# Patient Record
Sex: FEMALE | Race: WHITE | NOT HISPANIC OR LATINO | URBAN - METROPOLITAN AREA
[De-identification: names, ages, dates, MRNs, and addresses within clinical notes are randomized per-mention and may not be internally consistent; named-entity substitution may affect disease eponyms.]

---

## 2018-08-20 ENCOUNTER — INPATIENT (INPATIENT)
Facility: HOSPITAL | Age: 83
LOS: 2 days | Discharge: NURSING, REHAB OR LONG-TERM CARE | DRG: 57 | End: 2018-08-23
Attending: HOSPITALIST | Admitting: FAMILY MEDICINE
Payer: MEDICARE

## 2018-08-20 VITALS
SYSTOLIC BLOOD PRESSURE: 141 MMHG | TEMPERATURE: 99 F | HEART RATE: 81 BPM | DIASTOLIC BLOOD PRESSURE: 92 MMHG | OXYGEN SATURATION: 96 % | RESPIRATION RATE: 16 BRPM

## 2018-08-20 DIAGNOSIS — I10 ESSENTIAL (PRIMARY) HYPERTENSION: ICD-10-CM

## 2018-08-20 DIAGNOSIS — R41.0 DISORIENTATION, UNSPECIFIED: ICD-10-CM

## 2018-08-20 DIAGNOSIS — G30.0 ALZHEIMER'S DISEASE WITH EARLY ONSET: ICD-10-CM

## 2018-08-20 DIAGNOSIS — R41.82 ALTERED MENTAL STATUS, UNSPECIFIED: ICD-10-CM

## 2018-08-20 LAB
ALBUMIN SERPL ELPH-MCNC: 3.1 G/DL — LOW (ref 3.3–5)
ALP SERPL-CCNC: 96 U/L — SIGNIFICANT CHANGE UP (ref 40–120)
ALT FLD-CCNC: 18 U/L DA — SIGNIFICANT CHANGE UP (ref 10–45)
ANION GAP SERPL CALC-SCNC: 4 MMOL/L — LOW (ref 5–17)
ANISOCYTOSIS BLD QL: SLIGHT — SIGNIFICANT CHANGE UP
APPEARANCE UR: CLEAR — SIGNIFICANT CHANGE UP
AST SERPL-CCNC: 28 U/L — SIGNIFICANT CHANGE UP (ref 10–40)
BACTERIA # UR AUTO: ABNORMAL /HPF
BASOPHILS # BLD AUTO: 0.1 K/UL — SIGNIFICANT CHANGE UP (ref 0–0.2)
BILIRUB SERPL-MCNC: 0.6 MG/DL — SIGNIFICANT CHANGE UP (ref 0.2–1.2)
BILIRUB UR-MCNC: NEGATIVE — SIGNIFICANT CHANGE UP
BUN SERPL-MCNC: 16 MG/DL — SIGNIFICANT CHANGE UP (ref 7–23)
CALCIUM SERPL-MCNC: 8.6 MG/DL — SIGNIFICANT CHANGE UP (ref 8.4–10.5)
CHLORIDE SERPL-SCNC: 103 MMOL/L — SIGNIFICANT CHANGE UP (ref 96–108)
CK MB CFR SERPL CALC: <0.5 NG/ML — LOW (ref 0.5–10)
CK SERPL-CCNC: 95 U/L — SIGNIFICANT CHANGE UP (ref 25–170)
CO2 SERPL-SCNC: 30 MMOL/L — SIGNIFICANT CHANGE UP (ref 22–31)
COLOR SPEC: YELLOW — SIGNIFICANT CHANGE UP
CREAT SERPL-MCNC: 0.82 MG/DL — SIGNIFICANT CHANGE UP (ref 0.5–1.3)
DIFF PNL FLD: NEGATIVE — SIGNIFICANT CHANGE UP
EOSINOPHIL # BLD AUTO: 2.2 K/UL — HIGH (ref 0–0.5)
EOSINOPHIL NFR BLD AUTO: 18 % — HIGH (ref 0–6)
EPI CELLS # UR: SIGNIFICANT CHANGE UP
GLUCOSE SERPL-MCNC: 93 MG/DL — SIGNIFICANT CHANGE UP (ref 70–99)
GLUCOSE UR QL: NEGATIVE — SIGNIFICANT CHANGE UP
HCT VFR BLD CALC: 43.4 % — SIGNIFICANT CHANGE UP (ref 34.5–45)
HGB BLD-MCNC: 13.8 G/DL — SIGNIFICANT CHANGE UP (ref 11.5–15.5)
KETONES UR-MCNC: ABNORMAL
LEUKOCYTE ESTERASE UR-ACNC: ABNORMAL
LYMPHOCYTES # BLD AUTO: 2.8 K/UL — SIGNIFICANT CHANGE UP (ref 1–3.3)
LYMPHOCYTES # BLD AUTO: 28 % — SIGNIFICANT CHANGE UP (ref 13–44)
MCHC RBC-ENTMCNC: 31.1 PG — SIGNIFICANT CHANGE UP (ref 27–34)
MCHC RBC-ENTMCNC: 31.9 GM/DL — LOW (ref 32–36)
MCV RBC AUTO: 97.5 FL — SIGNIFICANT CHANGE UP (ref 80–100)
MONOCYTES # BLD AUTO: 0.7 K/UL — SIGNIFICANT CHANGE UP (ref 0–0.9)
NEUTROPHILS # BLD AUTO: 4.5 K/UL — SIGNIFICANT CHANGE UP (ref 1.8–7.4)
NEUTROPHILS NFR BLD AUTO: 54 % — SIGNIFICANT CHANGE UP (ref 43–77)
NITRITE UR-MCNC: NEGATIVE — SIGNIFICANT CHANGE UP
PH UR: 6 — SIGNIFICANT CHANGE UP (ref 5–8)
PLAT MORPH BLD: NORMAL — SIGNIFICANT CHANGE UP
PLATELET # BLD AUTO: 210 K/UL — SIGNIFICANT CHANGE UP (ref 150–400)
POTASSIUM SERPL-MCNC: 5.2 MMOL/L — SIGNIFICANT CHANGE UP (ref 3.5–5.3)
POTASSIUM SERPL-SCNC: 5.2 MMOL/L — SIGNIFICANT CHANGE UP (ref 3.5–5.3)
PROT SERPL-MCNC: 6.7 G/DL — SIGNIFICANT CHANGE UP (ref 6–8.3)
PROT UR-MCNC: 15
RBC # BLD: 4.45 M/UL — SIGNIFICANT CHANGE UP (ref 3.8–5.2)
RBC # FLD: 13 % — SIGNIFICANT CHANGE UP (ref 10.3–14.5)
RBC BLD AUTO: SIGNIFICANT CHANGE UP
RBC CASTS # UR COMP ASSIST: SIGNIFICANT CHANGE UP /HPF (ref 0–4)
SODIUM SERPL-SCNC: 137 MMOL/L — SIGNIFICANT CHANGE UP (ref 135–145)
SP GR SPEC: 1.02 — SIGNIFICANT CHANGE UP (ref 1.01–1.02)
TROPONIN I SERPL-MCNC: <.017 NG/ML — LOW (ref 0.02–0.06)
UROBILINOGEN FLD QL: 4
WBC # BLD: 10.1 K/UL — SIGNIFICANT CHANGE UP (ref 3.8–10.5)
WBC # FLD AUTO: 10.1 K/UL — SIGNIFICANT CHANGE UP (ref 3.8–10.5)
WBC UR QL: SIGNIFICANT CHANGE UP /HPF (ref 0–5)

## 2018-08-20 PROCEDURE — 99223 1ST HOSP IP/OBS HIGH 75: CPT

## 2018-08-20 PROCEDURE — 93010 ELECTROCARDIOGRAM REPORT: CPT

## 2018-08-20 PROCEDURE — 70450 CT HEAD/BRAIN W/O DYE: CPT | Mod: 26

## 2018-08-20 PROCEDURE — 71045 X-RAY EXAM CHEST 1 VIEW: CPT | Mod: 26

## 2018-08-20 PROCEDURE — 99285 EMERGENCY DEPT VISIT HI MDM: CPT

## 2018-08-20 RX ORDER — CEFTRIAXONE 500 MG/1
1 INJECTION, POWDER, FOR SOLUTION INTRAMUSCULAR; INTRAVENOUS EVERY 24 HOURS
Qty: 0 | Refills: 0 | Status: DISCONTINUED | OUTPATIENT
Start: 2018-08-21 | End: 2018-08-21

## 2018-08-20 RX ORDER — CEFTRIAXONE 500 MG/1
1 INJECTION, POWDER, FOR SOLUTION INTRAMUSCULAR; INTRAVENOUS ONCE
Qty: 0 | Refills: 0 | Status: DISCONTINUED | OUTPATIENT
Start: 2018-08-20 | End: 2018-08-21

## 2018-08-20 RX ORDER — SODIUM CHLORIDE 9 MG/ML
1000 INJECTION INTRAMUSCULAR; INTRAVENOUS; SUBCUTANEOUS ONCE
Qty: 0 | Refills: 0 | Status: COMPLETED | OUTPATIENT
Start: 2018-08-20 | End: 2018-08-20

## 2018-08-20 RX ORDER — ACETAMINOPHEN 500 MG
650 TABLET ORAL EVERY 6 HOURS
Qty: 0 | Refills: 0 | Status: DISCONTINUED | OUTPATIENT
Start: 2018-08-20 | End: 2018-08-23

## 2018-08-20 RX ORDER — ESCITALOPRAM OXALATE 10 MG/1
20 TABLET, FILM COATED ORAL DAILY
Qty: 0 | Refills: 0 | Status: DISCONTINUED | OUTPATIENT
Start: 2018-08-20 | End: 2018-08-23

## 2018-08-20 RX ORDER — SODIUM CHLORIDE 9 MG/ML
1000 INJECTION INTRAMUSCULAR; INTRAVENOUS; SUBCUTANEOUS
Qty: 0 | Refills: 0 | Status: DISCONTINUED | OUTPATIENT
Start: 2018-08-20 | End: 2018-08-21

## 2018-08-20 RX ORDER — MEMANTINE HYDROCHLORIDE 10 MG/1
1 TABLET ORAL
Qty: 0 | Refills: 0 | COMMUNITY

## 2018-08-20 RX ORDER — PANTOPRAZOLE SODIUM 20 MG/1
1 TABLET, DELAYED RELEASE ORAL
Qty: 0 | Refills: 0 | COMMUNITY

## 2018-08-20 RX ORDER — DILTIAZEM HCL 120 MG
0 CAPSULE, EXT RELEASE 24 HR ORAL
Qty: 0 | Refills: 0 | COMMUNITY

## 2018-08-20 RX ORDER — LOSARTAN/HYDROCHLOROTHIAZIDE 100MG-25MG
1 TABLET ORAL
Qty: 0 | Refills: 0 | COMMUNITY

## 2018-08-20 RX ORDER — MEMANTINE HYDROCHLORIDE 10 MG/1
5 TABLET ORAL
Qty: 0 | Refills: 0 | Status: DISCONTINUED | OUTPATIENT
Start: 2018-08-20 | End: 2018-08-23

## 2018-08-20 RX ORDER — PANTOPRAZOLE SODIUM 20 MG/1
40 TABLET, DELAYED RELEASE ORAL
Qty: 0 | Refills: 0 | Status: DISCONTINUED | OUTPATIENT
Start: 2018-08-20 | End: 2018-08-23

## 2018-08-20 RX ORDER — DONEPEZIL HYDROCHLORIDE 10 MG/1
10 TABLET, FILM COATED ORAL AT BEDTIME
Qty: 0 | Refills: 0 | Status: DISCONTINUED | OUTPATIENT
Start: 2018-08-20 | End: 2018-08-23

## 2018-08-20 RX ORDER — HYDROCHLOROTHIAZIDE 25 MG
12.5 TABLET ORAL DAILY
Qty: 0 | Refills: 0 | Status: DISCONTINUED | OUTPATIENT
Start: 2018-08-20 | End: 2018-08-23

## 2018-08-20 RX ORDER — SODIUM CHLORIDE 9 MG/ML
1000 INJECTION, SOLUTION INTRAVENOUS
Qty: 0 | Refills: 0 | Status: DISCONTINUED | OUTPATIENT
Start: 2018-08-20 | End: 2018-08-21

## 2018-08-20 RX ORDER — SODIUM CHLORIDE 9 MG/ML
3 INJECTION INTRAMUSCULAR; INTRAVENOUS; SUBCUTANEOUS ONCE
Qty: 0 | Refills: 0 | Status: COMPLETED | OUTPATIENT
Start: 2018-08-20 | End: 2018-08-20

## 2018-08-20 RX ORDER — ESCITALOPRAM OXALATE 10 MG/1
1 TABLET, FILM COATED ORAL
Qty: 0 | Refills: 0 | COMMUNITY

## 2018-08-20 RX ORDER — ENOXAPARIN SODIUM 100 MG/ML
30 INJECTION SUBCUTANEOUS EVERY 24 HOURS
Qty: 0 | Refills: 0 | Status: DISCONTINUED | OUTPATIENT
Start: 2018-08-20 | End: 2018-08-21

## 2018-08-20 RX ORDER — LOSARTAN POTASSIUM 100 MG/1
100 TABLET, FILM COATED ORAL DAILY
Qty: 0 | Refills: 0 | Status: DISCONTINUED | OUTPATIENT
Start: 2018-08-20 | End: 2018-08-23

## 2018-08-20 RX ORDER — CEFTRIAXONE 500 MG/1
INJECTION, POWDER, FOR SOLUTION INTRAMUSCULAR; INTRAVENOUS
Qty: 0 | Refills: 0 | Status: DISCONTINUED | OUTPATIENT
Start: 2018-08-20 | End: 2018-08-21

## 2018-08-20 RX ORDER — DONEPEZIL HYDROCHLORIDE 10 MG/1
1 TABLET, FILM COATED ORAL
Qty: 0 | Refills: 0 | COMMUNITY

## 2018-08-20 RX ADMIN — SODIUM CHLORIDE 3 MILLILITER(S): 9 INJECTION INTRAMUSCULAR; INTRAVENOUS; SUBCUTANEOUS at 18:50

## 2018-08-20 RX ADMIN — DONEPEZIL HYDROCHLORIDE 10 MILLIGRAM(S): 10 TABLET, FILM COATED ORAL at 23:36

## 2018-08-20 RX ADMIN — ENOXAPARIN SODIUM 30 MILLIGRAM(S): 100 INJECTION SUBCUTANEOUS at 21:10

## 2018-08-20 RX ADMIN — CEFTRIAXONE 100 GRAM(S): 500 INJECTION, POWDER, FOR SOLUTION INTRAMUSCULAR; INTRAVENOUS at 20:00

## 2018-08-20 RX ADMIN — SODIUM CHLORIDE 1000 MILLILITER(S): 9 INJECTION INTRAMUSCULAR; INTRAVENOUS; SUBCUTANEOUS at 19:30

## 2018-08-20 RX ADMIN — SODIUM CHLORIDE 70 MILLILITER(S): 9 INJECTION, SOLUTION INTRAVENOUS at 21:09

## 2018-08-20 NOTE — H&P ADULT - HISTORY OF PRESENT ILLNESS
85 Y o f from home, h/o dementia, htn, brought in by family for c/o increasing lethargy, s/p fall 4 days back, rolld out of bed baseline dependent on aide for ADL'S but functional status has been declining for the lsat 2 weeks, to the point that she is unable to stand on her feet. c/o pain int he right shoulder, mild, aggravated on movements, non radiating pain. no n/v, no sob. no fever, no cough. 85 Y o f from home, h/o dementia, htn, a fib on no A/C, brought in by family for c/o increasing lethargy, s/p fall 4 days back, rolld out of bed baseline dependent on aide for ADL'S but functional status has been declining for the lsat 2 weeks, to the point that she is unable to stand on her feet. c/o pain int he right shoulder, mild, aggravated on movements, non radiating pain. no n/v, no sob. no fever, no cough.

## 2018-08-20 NOTE — ED ADULT NURSE REASSESSMENT NOTE - NS ED NURSE REASSESS COMMENT FT1
Pt resting in stretcher.  Granddaughter @ bedside. Pt giving antibiotics as ordered. A&Ox2 Pt resting in stretcher.  Granddaughter @ bedside. Pt giving antibiotics as ordered. A&Ox2. Report giving to Rn.

## 2018-08-20 NOTE — H&P ADULT - NSHPLABSRESULTS_GEN_ALL_CORE
13.8                     137  | 30   | 16           10.1  >-----------< 210     ------------------------< 93                    43.4                         5.2  | 103  | 0.82                                         Ca 8.6   Mg x     Ph x      Urinalysis Basic - ( 20 Aug 2018 18:50 )    Color: Yellow / Appearance: Clear / S.025 / pH: x  Gluc: x / Ketone: Trace  / Bili: Negative / Urobili: 4   Blood: x / Protein: 15 / Nitrite: Negative   Leuk Esterase: Trace / RBC: 0-4 /HPF / WBC 3-5 /HPF   Sq Epi: x / Non Sq Epi: Neg.-Few / Bacteria: Trace /HPF      Urinalysis Basic - ( 20 Aug 2018 18:50 )    Color: Yellow / Appearance: Clear / S.025 / pH: x  Gluc: x / Ketone: Trace  / Bili: Negative / Urobili: 4   Blood: x / Protein: 15 / Nitrite: Negative   Leuk Esterase: Trace / RBC: 0-4 /HPF / WBC 3-5 /HPF   Sq Epi: x / Non Sq Epi: Neg.-Few / Bacteria: Trace /HPF    Labs reviewed:     CXR personally reviewed: no acute diseas    ECG reviewed and interpreted: sinus     head ct- IMPRESSION:  No acute intracranial hemorrhage or mass effect.    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 13.8                     137  | 30   | 16           10.1  >-----------< 210     ------------------------< 93                    43.4                         5.2  | 103  | 0.82                                         Ca 8.6   Mg x     Ph x      Urinalysis Basic - ( 20 Aug 2018 18:50 )    Color: Yellow / Appearance: Clear / S.025 / pH: x  Gluc: x / Ketone: Trace  / Bili: Negative / Urobili: 4   Blood: x / Protein: 15 / Nitrite: Negative   Leuk Esterase: Trace / RBC: 0-4 /HPF / WBC 3-5 /HPF   Sq Epi: x / Non Sq Epi: Neg.-Few / Bacteria: Trace /HPF      Urinalysis Basic - ( 20 Aug 2018 18:50 )    Color: Yellow / Appearance: Clear / S.025 / pH: x  Gluc: x / Ketone: Trace  / Bili: Negative / Urobili: 4   Blood: x / Protein: 15 / Nitrite: Negative   Leuk Esterase: Trace / RBC: 0-4 /HPF / WBC 3-5 /HPF   Sq Epi: x / Non Sq Epi: Neg.-Few / Bacteria: Trace /HPF    Labs reviewed:     CXR personally reviewed: no acute diseas    ECG reviewed and interpreted: a fib     head ct- IMPRESSION:  No acute intracranial hemorrhage or mass effect.    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

## 2018-08-20 NOTE — H&P ADULT - PROBLEM SELECTOR PLAN 1
admit to ed, ivf, fall precautions, aspiration precautions, speech and swallow eval  am labs, empirically treat for uti, urine culture pending.  pt/ot, eval for JEZ when medically stable. neice HCP- at bedside, agrees with plan,

## 2018-08-20 NOTE — ED ADULT NURSE NOTE - NSIMPLEMENTINTERV_GEN_ALL_ED
Implemented All Fall with Harm Risk Interventions:  [place] to call system. Call bell, personal items and telephone within reach. Instruct patient to call for assistance. Room bathroom lighting operational. Non-slip footwear when patient is off stretcher. Physically safe environment: no spills, clutter or unnecessary equipment. Stretcher in lowest position, wheels locked, appropriate side rails in place. Provide visual cue, wrist band, yellow gown, etc. Monitor gait and stability. Monitor for mental status changes and reorient to person, place, and time. Review medications for side effects contributing to fall risk. Reinforce activity limits and safety measures with patient and family. Provide visual clues: red socks.

## 2018-08-20 NOTE — H&P ADULT - NSHPPHYSICALEXAM_GEN_ALL_CORE
Vital Signs Last 24 Hrs  T(C): 37.1 (20 Aug 2018 16:42), Max: 37.1 (20 Aug 2018 16:42)  T(F): 98.8 (20 Aug 2018 16:42), Max: 98.8 (20 Aug 2018 16:42)  HR: 81 (20 Aug 2018 20:09) (70 - 81)  BP: 157/95 (20 Aug 2018 20:09) (141/92 - 168/79)  BP(mean): --  RR: 16 (20 Aug 2018 20:09) (16 - 16)  SpO2: 98% (20 Aug 2018 20:09) (95% - 98%)    nad, nadiya, mmm, ncat  supple  clear  s1s2  soft nt bs present  no edema  no gross deficits  aao x 1

## 2018-08-20 NOTE — H&P ADULT - PMH
Chronic hypertension    Early onset Alzheimer's dementia without behavioral disturbance Chronic atrial fibrillation    Chronic hypertension    Early onset Alzheimer's dementia without behavioral disturbance

## 2018-08-20 NOTE — ED PROVIDER NOTE - PHYSICAL EXAMINATION
General:     NAD, elderly frail dry   Head:     NC/AT, EOMI, oral mucosa moist  Neck:     trachea midline  Lungs:     CTA b/l, no w/r/r  CVS:     S1S2, RRR, no m/g/r  Abd:     +BS, s/nt/nd, no organomegaly  Ext:    2+ radial and pedal pulses, no c/c/e  Neuro: alert disoriented , motor , sensory grossly intact

## 2018-08-20 NOTE — H&P ADULT - PROBLEM SELECTOR PLAN 3
c/e Aricept, Namenda,   dnr/dni, ACP discussed with family at bedside, time spent 16 min, family understands, and agrees with plan, niece hcp AT BEDSIDE ALONG WITH DAUGHTER.

## 2018-08-20 NOTE — ED ADULT NURSE NOTE - OBJECTIVE STATEMENT
pt was weak on Friday and was slipping to the floor.  help pt to stand up using her RUE as a brace. Pt now complains of pain to R shoulder. R shoulder appears slightly swollen. 0 ecchymosis. 0 crepitus with palpation.

## 2018-08-20 NOTE — ED ADULT NURSE NOTE - CHIEF COMPLAINT
The patient is a 85y Female complaining of weakness. The patient is a 85y Female complaining of weakness. Per family pt has been taking  OTC meds for constipation and has had rebound bouts of diarrhea with a decreased po intake.

## 2018-08-21 DIAGNOSIS — Z29.9 ENCOUNTER FOR PROPHYLACTIC MEASURES, UNSPECIFIED: ICD-10-CM

## 2018-08-21 PROCEDURE — 99233 SBSQ HOSP IP/OBS HIGH 50: CPT

## 2018-08-21 PROCEDURE — 74018 RADEX ABDOMEN 1 VIEW: CPT | Mod: 26

## 2018-08-21 RX ORDER — ENOXAPARIN SODIUM 100 MG/ML
40 INJECTION SUBCUTANEOUS AT BEDTIME
Qty: 0 | Refills: 0 | Status: DISCONTINUED | OUTPATIENT
Start: 2018-08-21 | End: 2018-08-23

## 2018-08-21 RX ORDER — FAMOTIDINE 10 MG/ML
20 INJECTION INTRAVENOUS DAILY
Qty: 0 | Refills: 0 | Status: DISCONTINUED | OUTPATIENT
Start: 2018-08-21 | End: 2018-08-23

## 2018-08-21 RX ADMIN — PANTOPRAZOLE SODIUM 40 MILLIGRAM(S): 20 TABLET, DELAYED RELEASE ORAL at 08:12

## 2018-08-21 RX ADMIN — ENOXAPARIN SODIUM 40 MILLIGRAM(S): 100 INJECTION SUBCUTANEOUS at 21:05

## 2018-08-21 RX ADMIN — MEMANTINE HYDROCHLORIDE 5 MILLIGRAM(S): 10 TABLET ORAL at 17:21

## 2018-08-21 RX ADMIN — FAMOTIDINE 20 MILLIGRAM(S): 10 INJECTION INTRAVENOUS at 13:15

## 2018-08-21 RX ADMIN — LOSARTAN POTASSIUM 100 MILLIGRAM(S): 100 TABLET, FILM COATED ORAL at 05:31

## 2018-08-21 RX ADMIN — DONEPEZIL HYDROCHLORIDE 10 MILLIGRAM(S): 10 TABLET, FILM COATED ORAL at 21:05

## 2018-08-21 RX ADMIN — MEMANTINE HYDROCHLORIDE 5 MILLIGRAM(S): 10 TABLET ORAL at 05:31

## 2018-08-21 RX ADMIN — ESCITALOPRAM OXALATE 20 MILLIGRAM(S): 10 TABLET, FILM COATED ORAL at 11:50

## 2018-08-21 RX ADMIN — Medication 12.5 MILLIGRAM(S): at 05:30

## 2018-08-21 NOTE — PROGRESS NOTE ADULT - PROBLEM SELECTOR PLAN 1
admit to ed, ivf, fall precautions, aspiration precautions, speech and swallow eval  labs, no synptoms u/a negative d/c empirically treat for uti,   pt  eval recommends JEZ when medically stable.   philip HCP

## 2018-08-21 NOTE — PATIENT PROFILE ADULT. - PMH
Chronic atrial fibrillation    Chronic hypertension    Early onset Alzheimer's dementia without behavioral disturbance

## 2018-08-21 NOTE — PHYSICAL THERAPY INITIAL EVALUATION ADULT - ADDITIONAL COMMENTS
Pt is poor historian due to dementia, but states she lives in a house with her  and has an aide at home.  States she only walks "a little bit", unable to provide info on assistive devices.

## 2018-08-21 NOTE — PATIENT PROFILE ADULT. - VISION (WITH CORRECTIVE LENSES IF THE PATIENT USUALLY WEARS THEM):
Normal vision: sees adequately in most situations; can see medication labels, newsprint/pt uses glasses not with pt

## 2018-08-22 DIAGNOSIS — G93.41 METABOLIC ENCEPHALOPATHY: ICD-10-CM

## 2018-08-22 LAB
-  AMIKACIN: SIGNIFICANT CHANGE UP
-  AMOXICILLIN/CLAVULANIC ACID: SIGNIFICANT CHANGE UP
-  AMPICILLIN/SULBACTAM: SIGNIFICANT CHANGE UP
-  AMPICILLIN: SIGNIFICANT CHANGE UP
-  AZTREONAM: SIGNIFICANT CHANGE UP
-  CEFAZOLIN: SIGNIFICANT CHANGE UP
-  CEFEPIME: SIGNIFICANT CHANGE UP
-  CEFOXITIN: SIGNIFICANT CHANGE UP
-  CEFTRIAXONE: SIGNIFICANT CHANGE UP
-  CIPROFLOXACIN: SIGNIFICANT CHANGE UP
-  ERTAPENEM: SIGNIFICANT CHANGE UP
-  GENTAMICIN: SIGNIFICANT CHANGE UP
-  IMIPENEM: SIGNIFICANT CHANGE UP
-  LEVOFLOXACIN: SIGNIFICANT CHANGE UP
-  MEROPENEM: SIGNIFICANT CHANGE UP
-  NITROFURANTOIN: SIGNIFICANT CHANGE UP
-  PIPERACILLIN/TAZOBACTAM: SIGNIFICANT CHANGE UP
-  TIGECYCLINE: SIGNIFICANT CHANGE UP
-  TOBRAMYCIN: SIGNIFICANT CHANGE UP
-  TRIMETHOPRIM/SULFAMETHOXAZOLE: SIGNIFICANT CHANGE UP
ANION GAP SERPL CALC-SCNC: 6 MMOL/L — SIGNIFICANT CHANGE UP (ref 5–17)
BUN SERPL-MCNC: 13 MG/DL — SIGNIFICANT CHANGE UP (ref 7–23)
CALCIUM SERPL-MCNC: 8.2 MG/DL — LOW (ref 8.4–10.5)
CHLORIDE SERPL-SCNC: 105 MMOL/L — SIGNIFICANT CHANGE UP (ref 96–108)
CO2 SERPL-SCNC: 27 MMOL/L — SIGNIFICANT CHANGE UP (ref 22–31)
CREAT SERPL-MCNC: 0.69 MG/DL — SIGNIFICANT CHANGE UP (ref 0.5–1.3)
CULTURE RESULTS: SIGNIFICANT CHANGE UP
GLUCOSE SERPL-MCNC: 91 MG/DL — SIGNIFICANT CHANGE UP (ref 70–99)
HCT VFR BLD CALC: 38.6 % — SIGNIFICANT CHANGE UP (ref 34.5–45)
HGB BLD-MCNC: 12.8 G/DL — SIGNIFICANT CHANGE UP (ref 11.5–15.5)
MCHC RBC-ENTMCNC: 31.9 PG — SIGNIFICANT CHANGE UP (ref 27–34)
MCHC RBC-ENTMCNC: 33.3 GM/DL — SIGNIFICANT CHANGE UP (ref 32–36)
MCV RBC AUTO: 95.8 FL — SIGNIFICANT CHANGE UP (ref 80–100)
METHOD TYPE: SIGNIFICANT CHANGE UP
ORGANISM # SPEC MICROSCOPIC CNT: SIGNIFICANT CHANGE UP
ORGANISM # SPEC MICROSCOPIC CNT: SIGNIFICANT CHANGE UP
PLATELET # BLD AUTO: 182 K/UL — SIGNIFICANT CHANGE UP (ref 150–400)
POTASSIUM SERPL-MCNC: 4 MMOL/L — SIGNIFICANT CHANGE UP (ref 3.5–5.3)
POTASSIUM SERPL-SCNC: 4 MMOL/L — SIGNIFICANT CHANGE UP (ref 3.5–5.3)
RBC # BLD: 4.02 M/UL — SIGNIFICANT CHANGE UP (ref 3.8–5.2)
RBC # FLD: 12.4 % — SIGNIFICANT CHANGE UP (ref 10.3–14.5)
SODIUM SERPL-SCNC: 138 MMOL/L — SIGNIFICANT CHANGE UP (ref 135–145)
SPECIMEN SOURCE: SIGNIFICANT CHANGE UP
WBC # BLD: 8.5 K/UL — SIGNIFICANT CHANGE UP (ref 3.8–10.5)
WBC # FLD AUTO: 8.5 K/UL — SIGNIFICANT CHANGE UP (ref 3.8–10.5)

## 2018-08-22 PROCEDURE — 99233 SBSQ HOSP IP/OBS HIGH 50: CPT

## 2018-08-22 RX ADMIN — MEMANTINE HYDROCHLORIDE 5 MILLIGRAM(S): 10 TABLET ORAL at 17:07

## 2018-08-22 RX ADMIN — PANTOPRAZOLE SODIUM 40 MILLIGRAM(S): 20 TABLET, DELAYED RELEASE ORAL at 05:49

## 2018-08-22 RX ADMIN — ESCITALOPRAM OXALATE 20 MILLIGRAM(S): 10 TABLET, FILM COATED ORAL at 11:21

## 2018-08-22 RX ADMIN — DONEPEZIL HYDROCHLORIDE 10 MILLIGRAM(S): 10 TABLET, FILM COATED ORAL at 22:46

## 2018-08-22 RX ADMIN — LOSARTAN POTASSIUM 100 MILLIGRAM(S): 100 TABLET, FILM COATED ORAL at 05:49

## 2018-08-22 RX ADMIN — MEMANTINE HYDROCHLORIDE 5 MILLIGRAM(S): 10 TABLET ORAL at 05:49

## 2018-08-22 RX ADMIN — FAMOTIDINE 20 MILLIGRAM(S): 10 INJECTION INTRAVENOUS at 11:21

## 2018-08-22 RX ADMIN — Medication 12.5 MILLIGRAM(S): at 05:49

## 2018-08-22 RX ADMIN — ENOXAPARIN SODIUM 40 MILLIGRAM(S): 100 INJECTION SUBCUTANEOUS at 22:44

## 2018-08-22 NOTE — SWALLOW BEDSIDE ASSESSMENT ADULT - SLP PERTINENT HISTORY OF CURRENT PROBLEM
85 Y o f from home, h/o dementia, htn, a fib on no A/C, brought in by family for c/o increasing lethargy, s/p fall 4 days back, rolld out of bed baseline dependent on aide for ADL'S but functional status has been declining for the lsat 2 weeks, to the point that she is unable to stand on her feet. c/o pain int he right shoulder, mild, aggravated on movements, non radiating pain. no n/v, no sob. no fever, no cough. WBC: 8.5. Chest xray 20-Aug-2018: The heart is not enlarged. There is no focal infiltrate or pleural effusion. There is osteopenia of the bony structures and degenerative change. Impression: No active pulmonary disease.

## 2018-08-22 NOTE — SWALLOW BEDSIDE ASSESSMENT ADULT - MODE OF PRESENTATION
self fed/spoon/fed by clinician self fed self fed/cup/fed by clinician fed by clinician/cup/self fed

## 2018-08-22 NOTE — SWALLOW BEDSIDE ASSESSMENT ADULT - PHARYNGEAL PHASE
Within functional limits Delayed pharyngeal swallow/appears WFL appears WFL/Delayed pharyngeal swallow

## 2018-08-22 NOTE — PROGRESS NOTE ADULT - ATTENDING COMMENTS
Plan of care discussed with patient, and  at bedside, agrees, all questions answered.   Martina Prado MD
Plan of care discussed with patient, and HCP, agrees, all questions answered.   Martina Prado MD

## 2018-08-22 NOTE — SWALLOW BEDSIDE ASSESSMENT ADULT - SWALLOW EVAL: RECOMMENDED FEEDING/EATING TECHNIQUES
position upright (90 degrees)/check mouth frequently for oral residue/pocketing/crush medication (when feasible)/allow for swallow between intakes/small sips/bites

## 2018-08-22 NOTE — SWALLOW BEDSIDE ASSESSMENT ADULT - SWALLOW EVAL: DIAGNOSIS
Pt presents with +oropharyngeal dysphagia, oral stage impacted by reduced cognition and characterized by intermittent oral holding with purees and liquids. Mildly increased oral transit time and mild lingual residue for hard solids. There is suspected swallow delay upon palpation however appears WFL as no signs/symptoms of penetration/aspiration were observed across consistencies. Chest xray is noted to be clear.

## 2018-08-22 NOTE — PROGRESS NOTE ADULT - PROBLEM SELECTOR PLAN 1
admit to ed, ivf, fall precautions, aspiration precautions, speech and swallow eval  labs, no symptoms u/a negative d/c empirically treat for uti,   pt  eval recommends JEZ, 3rd night today.   philip HCP admitted to ed, ivf, fall precautions, aspiration precautions, speech and swallow eval, labs, no symptoms u/a negative d/c empirically treat for uti,   pt  eval recommends JEZ, 3rd night today.   philip HCP

## 2018-08-23 ENCOUNTER — TRANSCRIPTION ENCOUNTER (OUTPATIENT)
Age: 83
End: 2018-08-23

## 2018-08-23 VITALS — WEIGHT: 139.99 LBS

## 2018-08-23 DIAGNOSIS — F03.90 UNSPECIFIED DEMENTIA WITHOUT BEHAVIORAL DISTURBANCE: ICD-10-CM

## 2018-08-23 DIAGNOSIS — I48.2 CHRONIC ATRIAL FIBRILLATION: ICD-10-CM

## 2018-08-23 DIAGNOSIS — R82.71 BACTERIURIA: ICD-10-CM

## 2018-08-23 DIAGNOSIS — D72.1 EOSINOPHILIA: ICD-10-CM

## 2018-08-23 LAB
24R-OH-CALCIDIOL SERPL-MCNC: 25.6 NG/ML — LOW (ref 30–80)
ALBUMIN SERPL ELPH-MCNC: 2.6 G/DL — LOW (ref 3.3–5)
ALP SERPL-CCNC: 85 U/L — SIGNIFICANT CHANGE UP (ref 40–120)
ALT FLD-CCNC: 16 U/L DA — SIGNIFICANT CHANGE UP (ref 10–45)
ANION GAP SERPL CALC-SCNC: 3 MMOL/L — LOW (ref 5–17)
AST SERPL-CCNC: 15 U/L — SIGNIFICANT CHANGE UP (ref 10–40)
BILIRUB SERPL-MCNC: 0.4 MG/DL — SIGNIFICANT CHANGE UP (ref 0.2–1.2)
BUN SERPL-MCNC: 16 MG/DL — SIGNIFICANT CHANGE UP (ref 7–23)
CALCIUM SERPL-MCNC: 8.6 MG/DL — SIGNIFICANT CHANGE UP (ref 8.4–10.5)
CHLORIDE SERPL-SCNC: 104 MMOL/L — SIGNIFICANT CHANGE UP (ref 96–108)
CO2 SERPL-SCNC: 30 MMOL/L — SIGNIFICANT CHANGE UP (ref 22–31)
CREAT SERPL-MCNC: 0.81 MG/DL — SIGNIFICANT CHANGE UP (ref 0.5–1.3)
EOSINOPHIL NFR BLD AUTO: 21 % — HIGH (ref 0–6)
FOLATE SERPL-MCNC: 7.5 NG/ML — SIGNIFICANT CHANGE UP
GLUCOSE SERPL-MCNC: 88 MG/DL — SIGNIFICANT CHANGE UP (ref 70–99)
HCT VFR BLD CALC: 38.8 % — SIGNIFICANT CHANGE UP (ref 34.5–45)
HGB BLD-MCNC: 12.5 G/DL — SIGNIFICANT CHANGE UP (ref 11.5–15.5)
LYMPHOCYTES # BLD AUTO: 31 % — SIGNIFICANT CHANGE UP (ref 13–44)
MCHC RBC-ENTMCNC: 31.4 PG — SIGNIFICANT CHANGE UP (ref 27–34)
MCHC RBC-ENTMCNC: 32.2 GM/DL — SIGNIFICANT CHANGE UP (ref 32–36)
MCV RBC AUTO: 97.2 FL — SIGNIFICANT CHANGE UP (ref 80–100)
MONOCYTES NFR BLD AUTO: 5 % — SIGNIFICANT CHANGE UP (ref 2–14)
NEUTROPHILS NFR BLD AUTO: 41 % — LOW (ref 43–77)
PLATELET # BLD AUTO: 207 K/UL — SIGNIFICANT CHANGE UP (ref 150–400)
POTASSIUM SERPL-MCNC: 3.9 MMOL/L — SIGNIFICANT CHANGE UP (ref 3.5–5.3)
POTASSIUM SERPL-SCNC: 3.9 MMOL/L — SIGNIFICANT CHANGE UP (ref 3.5–5.3)
PROT SERPL-MCNC: 5.8 G/DL — LOW (ref 6–8.3)
RBC # BLD: 4 M/UL — SIGNIFICANT CHANGE UP (ref 3.8–5.2)
RBC # FLD: 12.5 % — SIGNIFICANT CHANGE UP (ref 10.3–14.5)
SODIUM SERPL-SCNC: 137 MMOL/L — SIGNIFICANT CHANGE UP (ref 135–145)
TSH SERPL-MCNC: 1.2 UIU/ML — SIGNIFICANT CHANGE UP (ref 0.27–4.2)
VIT B12 SERPL-MCNC: 261 PG/ML — SIGNIFICANT CHANGE UP (ref 232–1245)
WBC # BLD: 8.8 K/UL — SIGNIFICANT CHANGE UP (ref 3.8–10.5)
WBC # FLD AUTO: 8.8 K/UL — SIGNIFICANT CHANGE UP (ref 3.8–10.5)

## 2018-08-23 PROCEDURE — 71045 X-RAY EXAM CHEST 1 VIEW: CPT

## 2018-08-23 PROCEDURE — 92610 EVALUATE SWALLOWING FUNCTION: CPT

## 2018-08-23 PROCEDURE — 87086 URINE CULTURE/COLONY COUNT: CPT

## 2018-08-23 PROCEDURE — 87186 SC STD MICRODIL/AGAR DIL: CPT

## 2018-08-23 PROCEDURE — 82306 VITAMIN D 25 HYDROXY: CPT

## 2018-08-23 PROCEDURE — 82607 VITAMIN B-12: CPT

## 2018-08-23 PROCEDURE — 80048 BASIC METABOLIC PNL TOTAL CA: CPT

## 2018-08-23 PROCEDURE — 97161 PT EVAL LOW COMPLEX 20 MIN: CPT

## 2018-08-23 PROCEDURE — 99239 HOSP IP/OBS DSCHRG MGMT >30: CPT

## 2018-08-23 PROCEDURE — 85027 COMPLETE CBC AUTOMATED: CPT

## 2018-08-23 PROCEDURE — 70450 CT HEAD/BRAIN W/O DYE: CPT

## 2018-08-23 PROCEDURE — 84484 ASSAY OF TROPONIN QUANT: CPT

## 2018-08-23 PROCEDURE — 81001 URINALYSIS AUTO W/SCOPE: CPT

## 2018-08-23 PROCEDURE — 96374 THER/PROPH/DIAG INJ IV PUSH: CPT

## 2018-08-23 PROCEDURE — 80053 COMPREHEN METABOLIC PANEL: CPT

## 2018-08-23 PROCEDURE — 82553 CREATINE MB FRACTION: CPT

## 2018-08-23 PROCEDURE — 74018 RADEX ABDOMEN 1 VIEW: CPT

## 2018-08-23 PROCEDURE — 82746 ASSAY OF FOLIC ACID SERUM: CPT

## 2018-08-23 PROCEDURE — 99285 EMERGENCY DEPT VISIT HI MDM: CPT | Mod: 25

## 2018-08-23 PROCEDURE — 93005 ELECTROCARDIOGRAM TRACING: CPT

## 2018-08-23 PROCEDURE — 82550 ASSAY OF CK (CPK): CPT

## 2018-08-23 PROCEDURE — 84443 ASSAY THYROID STIM HORMONE: CPT

## 2018-08-23 RX ORDER — FAMOTIDINE 10 MG/ML
1 INJECTION INTRAVENOUS
Qty: 0 | Refills: 0 | COMMUNITY
Start: 2018-08-23

## 2018-08-23 RX ORDER — LOSARTAN POTASSIUM 100 MG/1
1 TABLET, FILM COATED ORAL
Qty: 0 | Refills: 0 | COMMUNITY
Start: 2018-08-23

## 2018-08-23 RX ADMIN — Medication 12.5 MILLIGRAM(S): at 06:15

## 2018-08-23 RX ADMIN — LOSARTAN POTASSIUM 100 MILLIGRAM(S): 100 TABLET, FILM COATED ORAL at 06:15

## 2018-08-23 RX ADMIN — ESCITALOPRAM OXALATE 20 MILLIGRAM(S): 10 TABLET, FILM COATED ORAL at 11:25

## 2018-08-23 RX ADMIN — MEMANTINE HYDROCHLORIDE 5 MILLIGRAM(S): 10 TABLET ORAL at 06:15

## 2018-08-23 RX ADMIN — PANTOPRAZOLE SODIUM 40 MILLIGRAM(S): 20 TABLET, DELAYED RELEASE ORAL at 06:15

## 2018-08-23 RX ADMIN — FAMOTIDINE 20 MILLIGRAM(S): 10 INJECTION INTRAVENOUS at 11:25

## 2018-08-23 NOTE — DIETITIAN INITIAL EVALUATION ADULT. - OTHER INFO
Pt. unable to give information.  at bedside.  reports  good appetite. Denies N/V/ diarrhea. no dysphagia - EDEMA     uNINTENTIONAL WEIGHT LOSS OVER THE YEARS. SKIN INTACT

## 2018-08-23 NOTE — PROGRESS NOTE ADULT - SUBJECTIVE AND OBJECTIVE BOX
Patient is a 85 year old  Female who presents with a chief complaint of change in mental status (21 Aug 2018 08:25)    Patient seen and examined this morning, reports difficulty with walking    MEDICATIONS  (STANDING):  diltiazem    Tablet 60 milliGRAM(s) Oral three times a day  donepezil 10 milliGRAM(s) Oral at bedtime  enoxaparin Injectable 40 milliGRAM(s) SubCutaneous at bedtime  escitalopram 20 milliGRAM(s) Oral daily  famotidine    Tablet 20 milliGRAM(s) Oral daily  hydrochlorothiazide 12.5 milliGRAM(s) Oral daily  losartan 100 milliGRAM(s) Oral daily  memantine 5 milliGRAM(s) Oral two times a day  pantoprazole    Tablet 40 milliGRAM(s) Oral before breakfast    MEDICATIONS  (PRN):  acetaminophen   Tablet 650 milliGRAM(s) Oral every 6 hours PRN For Temp greater than 38 C (100.4 F)      REVIEW OF SYSTEMS:  CONSTITUTIONAL: No fever, weight loss, has fatigue  EYES: No eye pain, visual disturbances, or discharge  ENMT:  No difficulty hearing, tinnitus, vertigo; No sinus or throat pain  NECK: No pain or stiffness  RESPIRATORY: No cough, wheezing, chills or hemoptysis; No shortness of breath  CARDIOVASCULAR: No chest pain, palpitations, dizziness, or leg swelling  GASTROINTESTINAL: No abdominal or epigastric pain. No nausea, vomiting, or hematemesis; No diarrhea or constipation. No melena or hematochezia.  GENITOURINARY: No dysuria, frequency, hematuria, or incontinence  NEUROLOGICAL: No headaches, memory loss, loss of strength, numbness, or tremors  SKIN: No itching, burning, rashes, or lesions   ENDOCRINE: No heat or cold intolerance; No hair loss  MUSCULOSKELETAL: No joint pain or swelling; No muscle, back, or extremity pain  PSYCHIATRIC: No depression, anxiety, mood swings, or difficulty sleeping  HEME/LYMPH: No easy bruising, or bleeding gums  ALLERY AND IMMUNOLOGIC: No hives or eczema        PHYSICAL EXAM:  T(C): 37.1 (18 @ 20:18), Max: 37.2 (18 @ 04:29)  HR: 86 (18 @ 20:18) (60 - 89)  BP: 146/72 (18 @ 20:18) (104/66 - 164/93)  RR: 14 (18 @ 20:18) (14 - 16)  SpO2: 97% (18 @ 20:18) (95% - 99%)    I&O's Summary    20 Aug 2018 07:  -  21 Aug 2018 07:00  --------------------------------------------------------  IN: 1000 mL / OUT: 0 mL / NET: 1000 mL    21 Aug 2018 07:01  -  21 Aug 2018 21:11  --------------------------------------------------------  IN: 860 mL / OUT: 0 mL / NET: 860 mL        GENERAL: NAD, well-groomed, well-developed  HEAD:  Atraumatic, Normocephalic  EYES: EOMI, PERRL, conjunctiva and sclera clear  ENMT: Moist mucous membranes, Good dentition, No lesions  NECK: Supple, No JVD, Normal thyroid  NERVOUS SYSTEM:  Alert & Oriented X2,   CHEST/LUNG: Clear to ascultation bilaterally; No rales, rhonchi, wheezing, or rubs  HEART: Regular rate and rhythm; No murmurs, rubs, or gallops  ABDOMEN: Soft, Nontender, Nondistended; Bowel sounds present  EXTREMITIES:  2+ Peripheral Pulses, No clubbing, cyanosis, or edema  SKIN: No rashes or lesions    LABS:                        13.8   10.1  )-----------( 210      ( 20 Aug 2018 18:20 )             43.4     08-20    137  |  103  |  16  ----------------------------<  93  5.2   |  30  |  0.82    Ca    8.6      20 Aug 2018 18:20    TPro  6.7  /  Alb  3.1<L>  /  TBili  0.6  /  DBili  x   /  AST  28  /  ALT  18  /  AlkPhos  96  08-20      Urinalysis Basic - ( 20 Aug 2018 18:50 )    Color: Yellow / Appearance: Clear / S.025 / pH: x  Gluc: x / Ketone: Trace  / Bili: Negative / Urobili: 4   Blood: x / Protein: 15 / Nitrite: Negative   Leuk Esterase: Trace / RBC: 0-4 /HPF / WBC 3-5 /HPF   Sq Epi: x / Non Sq Epi: Neg.-Few / Bacteria: Trace /HPF      Culture - Urine (collected 18 @ 18:50)  Source: .Urine Catheterized  Preliminary Report (18 @ 17:37):    10,000 - 49,000 CFU/mL Escherichia coli        RADIOLOGY & ADDITIONAL TESTS:    Imaging Personally Reviewed:  [x] YES  [ ] NO    Consultant(s) Notes Reviewed:  [x] YES  [ ] NO    Care Discussed with Consultants/Other Providers [x] YES  [ ] NO
Patient is a 85F who presents with a chief complaint of change in mental status (21 Aug 2018 08:25)    Patient seen and examined at bedside. Per , at bedside, patient has been in this state of progressively worsening lethargy over the course of MONTHS. The patient fell recently, as well, prior to the hospitalization.    ALLERGIES:  flu vaccines (Unknown)    MEDICATIONS  (STANDING):  diltiazem    Tablet 60 milliGRAM(s) Oral three times a day  donepezil 10 milliGRAM(s) Oral at bedtime  enoxaparin Injectable 40 milliGRAM(s) SubCutaneous at bedtime  escitalopram 20 milliGRAM(s) Oral daily  famotidine    Tablet 20 milliGRAM(s) Oral daily  hydrochlorothiazide 12.5 milliGRAM(s) Oral daily  losartan 100 milliGRAM(s) Oral daily  memantine 5 milliGRAM(s) Oral two times a day  pantoprazole    Tablet 40 milliGRAM(s) Oral before breakfast    MEDICATIONS  (PRN):  acetaminophen   Tablet 650 milliGRAM(s) Oral every 6 hours PRN For Temp greater than 38 C (100.4 F)    Vital Signs Last 24 Hrs  T(F): 97.9 (23 Aug 2018 06:11), Max: 98.3 (22 Aug 2018 15:37)  HR: 74 (23 Aug 2018 04:39) (56 - 78)  BP: 135/84 (23 Aug 2018 06:11) (98/56 - 138/77)  RR: 14 (23 Aug 2018 06:11) (14 - 16)  SpO2: 98% (23 Aug 2018 06:11) (97% - 98%)  I&O's Summary    22 Aug 2018 07:  -  23 Aug 2018 07:00  --------------------------------------------------------  IN: 660 mL / OUT: 0 mL / NET: 660 mL    23 Aug 2018 07:01  -  23 Aug 2018 11:39  --------------------------------------------------------  IN: 320 mL / OUT: 0 mL / NET: 320 mL      PHYSICAL EXAM:  General: NAD  ENT: MMM  Neck: Supple  Lungs: Clear to auscultation bilaterally  Cardio: IRRR, S1/S2  Abdomen: Soft, Nontender, Nondistended; Bowel sounds present  Extremities: No calf tenderness    LABS:                        12.5   8.8   )-----------( 207      ( 23 Aug 2018 06:57 )             38.8         137  |  104  |  16  ----------------------------<  88  3.9   |  30  |  0.81    Ca    8.6      23 Aug 2018 06:57    TPro  5.8  /  Alb  2.6  /  TBili  0.4  /  DBili  x   /  AST  15  /  ALT  16  /  AlkPhos  85      eGFR if Non African American: 66 mL/min/1.73M2 (18 @ 06:57)  eGFR if : 77 mL/min/1.73M2 (18 @ 06:57)        CARDIAC MARKERS ( 20 Aug 2018 18:20 )  <.017 ng/mL / x     / 95 U/L / x     / <0.5 ng/mL              CAPILLARY BLOOD GLUCOSE          Urinalysis Basic - ( 20 Aug 2018 18:50 )    Color: Yellow / Appearance: Clear / S.025 / pH: x  Gluc: x / Ketone: Trace  / Bili: Negative / Urobili: 4   Blood: x / Protein: 15 / Nitrite: Negative   Leuk Esterase: Trace / RBC: 0-4 /HPF / WBC 3-5 /HPF   Sq Epi: x / Non Sq Epi: Neg.-Few / Bacteria: Trace /HPF        Culture - Urine (collected 20 Aug 2018 18:50)  Source: .Urine Catheterized  Final Report (22 Aug 2018 23:18):    10,000 - 49,000 CFU/mL Escherichia coli    Normal Urogenital arlene present  Organism: Escherichia coli (22 Aug 2018 23:18)  Organism: Escherichia coli (22 Aug 2018 23:18)      -  Amikacin: S <=8      -  Amoxicillin/Clavulanic Acid: I 16/8      -  Ampicillin: R >16 These ampicillin results predict results for amoxicillin      -  Ampicillin/Sulbactam: R >16/8      -  Aztreonam: S <=4      -  Cefazolin: R 8 For uncomplicated UTI with K. pneumoniae, E. coli, or P. mirablis: JULIENNE <=16 is sensitive and JULIENNE >=32 is resistant. This also predicts results for oral agents cefaclor, cefdinir, cefpodoxime, cefprozil, cefuroxime axetil, cephalexin and locarbef for uncomplicated UTI. Note that some isolates may be susceptible to these agents while testing resistant to cefazolin.      -  Cefepime: S <=2      -  Cefoxitin: S 8      -  Ceftriaxone: S <=1 Enterobacter, Citrobacter, and Serratia may develop resistance during prolonged therapy      -  Ciprofloxacin: S <=0.5      -  Ertapenem: S <=0.5      -  Gentamicin: S <=1      -  Imipenem: S <=1      -  Levofloxacin: S <=1      -  Meropenem: S <=1      -  Nitrofurantoin: S <=32 Should not be used to treat pyelonephritis      -  Piperacillin/Tazobactam: S <=8      -  Tigecycline: S <=1      -  Tobramycin: S <=2      -  Trimethoprim/Sulfamethoxazole: S <=0.5/9.5      Method Type: JULIENNE
Patient is a 85 year old  Female who presents with a chief complaint of change in mental status (21 Aug 2018 08:25)    Patient seen and examined this morning    MEDICATIONS  (STANDING):  diltiazem    Tablet 60 milliGRAM(s) Oral three times a day  donepezil 10 milliGRAM(s) Oral at bedtime  enoxaparin Injectable 40 milliGRAM(s) SubCutaneous at bedtime  escitalopram 20 milliGRAM(s) Oral daily  famotidine    Tablet 20 milliGRAM(s) Oral daily  hydrochlorothiazide 12.5 milliGRAM(s) Oral daily  losartan 100 milliGRAM(s) Oral daily  memantine 5 milliGRAM(s) Oral two times a day  pantoprazole    Tablet 40 milliGRAM(s) Oral before breakfast    MEDICATIONS  (PRN):  acetaminophen   Tablet 650 milliGRAM(s) Oral every 6 hours PRN For Temp greater than 38 C (100.4 F)      REVIEW OF SYSTEMS:  CONSTITUTIONAL: No fever, weight loss, has fatigue  EYES: No eye pain, visual disturbances, or discharge  ENMT:  No difficulty hearing, tinnitus, vertigo; No sinus or throat pain  NECK: No pain or stiffness  RESPIRATORY: No cough, wheezing, chills or hemoptysis; No shortness of breath  CARDIOVASCULAR: No chest pain, palpitations, dizziness, or leg swelling  GASTROINTESTINAL: No abdominal or epigastric pain. No nausea, vomiting, or hematemesis; No diarrhea or constipation. No melena or hematochezia.  GENITOURINARY: No dysuria, frequency, hematuria, or incontinence  NEUROLOGICAL: No headaches, memory loss, loss of strength, numbness, or tremors  SKIN: No itching, burning, rashes, or lesions   ENDOCRINE: No heat or cold intolerance; No hair loss  MUSCULOSKELETAL: Has difficulty walking. No joint pain or swelling; No muscle, back, or extremity pain  PSYCHIATRIC: No depression, anxiety, mood swings, or difficulty sleeping  HEME/LYMPH: No easy bruising, or bleeding gums  ALLERY AND IMMUNOLOGIC: No hives or eczema      PHYSICAL EXAM:    T(C): 36.7 (18 @ 05:45), Max: 37.1 (18 @ 20:18)  HR: 67 (18 @ 05:45) (60 - 86)  BP: 132/70 (18 @ 05:45) (104/66 - 146/72)  RR: 16 (18 @ 05:45) (14 - 16)  SpO2: 98% (18 @ 05:45) (97% - 98%)    I&O's Summary    21 Aug 2018 07:01  -  22 Aug 2018 07:00  --------------------------------------------------------  IN: 860 mL / OUT: 0 mL / NET: 860 mL      GENERAL: NAD, well-groomed, well-developed  HEAD:  Atraumatic, Normocephalic  EYES: EOMI, PERRL, conjunctiva and sclera clear  ENMT: Moist mucous membranes, Good dentition, No lesions  NECK: Supple, No JVD, Normal thyroid  NERVOUS SYSTEM:  Alert & Oriented X2,   CHEST/LUNG: Clear to ascultation bilaterally; No rales, rhonchi, wheezing, or rubs  HEART: Regular rate and rhythm; No murmurs, rubs, or gallops  ABDOMEN: Soft, Nontender, Nondistended; Bowel sounds present  EXTREMITIES:  2+ Peripheral Pulses, No clubbing, cyanosis, or edema  SKIN: No rashes or lesions    LABS:                        12.8   8.5   )-----------( 182      ( 22 Aug 2018 07:35 )             38.6     08-    138  |  105  |  13  ----------------------------<  91  4.0   |  27  |  0.69    Ca    8.2<L>      22 Aug 2018 07:35    TPro  6.7  /  Alb  3.1<L>  /  TBili  0.6  /  DBili  x   /  AST  28  /  ALT  18  /  AlkPhos  96  08-20      Urinalysis Basic - ( 20 Aug 2018 18:50 )    Color: Yellow / Appearance: Clear / S.025 / pH: x  Gluc: x / Ketone: Trace  / Bili: Negative / Urobili: 4   Blood: x / Protein: 15 / Nitrite: Negative   Leuk Esterase: Trace / RBC: 0-4 /HPF / WBC 3-5 /HPF   Sq Epi: x / Non Sq Epi: Neg.-Few / Bacteria: Trace /HPF        Culture - Urine (collected 18 @ 18:50)  Source: .Urine Catheterized  Preliminary Report (18 @ 17:37):    10,000 - 49,000 CFU/mL Escherichia coli        RADIOLOGY & ADDITIONAL TESTS:    Imaging Personally Reviewed:  [x] YES  [ ] NO    Consultant(s) Notes Reviewed:  [x] YES  [ ] NO    Care Discussed with Consultants/Other Providers [x] YES  [ ] NO

## 2018-08-23 NOTE — PROGRESS NOTE ADULT - PROBLEM SELECTOR PLAN 3
c/w Aricept, Namenda,   dnr/dni
c/w losartan-hctz, diltiazem  BP stable
c/w Aricept, Namenda,   dnr/dni

## 2018-08-23 NOTE — PROGRESS NOTE ADULT - PROBLEM SELECTOR PROBLEM 3
Early onset Alzheimer's dementia without behavioral disturbance
Chronic hypertension
Early onset Alzheimer's dementia without behavioral disturbance

## 2018-08-23 NOTE — DISCHARGE NOTE ADULT - HOSPITAL COURSE
85 Y o f from home, h/o dementia, htn, a fib on no A/C, brought in by family for c/o increasing lethargy, s/p fall 4 days back prior to admission. Physical therapy recommends JEZ.  < from: Xray Kidney Ureter Bladder (08.21.18 @ 13:33) >  INTERPRETATION:  Supine abdomen    History altered mental status    There is been surgical fixation of the right femoral neck. There is no   gastric or bowel dilatation, mass effect or abnormal calcification.        < end of copied text >  < from: CT Head No Cont (08.20.18 @ 17:34) >    IMPRESSION:  No acute intracranial hemorrhage or mass effect.    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      < end of copied text > 85 Y o f from home, h/o dementia, htn, a fib on no A/C, brought in by family for c/o increasing lethargy, s/p fall 4 days back prior to admission. Physical therapy recommends [facility]. Dementia is progressing. Will need to figure out long term care upon discharge from [facility].  < from: Xray Kidney Ureter Bladder (08.21.18 @ 13:33) >  INTERPRETATION:  Supine abdomen    History altered mental status    There is been surgical fixation of the right femoral neck. There is no   gastric or bowel dilatation, mass effect or abnormal calcification.        < end of copied text >  < from: CT Head No Cont (08.20.18 @ 17:34) >    IMPRESSION:  No acute intracranial hemorrhage or mass effect.    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      < end of copied text >

## 2018-08-23 NOTE — PROGRESS NOTE ADULT - ASSESSMENT
85 Y o f from home, h/o dementia, htn, a fib on no A/C, brought in by family for c/o increasing lethargy, s/p fall 4 days back prior to admission. Physical therapy recommends [facility]      TIME SPENT ON DISCHARGE 34 MINUTES.
85 Y o f from home, h/o dementia, htn, a fib on no A/C, brought in by family for c/o increasing lethargy, s/p fall 4 days back prior to admission. Physical therapy recommends [facility]

## 2018-08-23 NOTE — DISCHARGE NOTE ADULT - CARE PROVIDER_API CALL
Ortiz Daniel (), Internal Medicine  [address] [place] [address]  Phone: [phone]  Fax: [phone]

## 2018-08-23 NOTE — PROGRESS NOTE ADULT - PROBLEM SELECTOR PLAN 2
c/w losartan-hctz, diltiazem  BP stable
Outpatient workup for eosinophilia if family requests further treatment  D/W Dr. Daniel
c/w losartan-hctz, diltiazem  BP stable

## 2018-08-23 NOTE — DISCHARGE NOTE ADULT - CARE PLAN
Principal Discharge DX:	Advanced dementia  Goal:	to maintain baseline mental state  Assessment and plan of treatment:	supportive care, current medication regimen  Secondary Diagnosis:	Chronic atrial fibrillation  Secondary Diagnosis:	Chronic hypertension  Secondary Diagnosis:	Eosinophilia

## 2018-08-23 NOTE — DISCHARGE NOTE ADULT - PATIENT PORTAL LINK FT
You can access the [facility] Patient Portal, offered by [facility], by registering with the following website: [email or web address]

## 2018-08-23 NOTE — DISCHARGE NOTE ADULT - MEDICATION SUMMARY - MEDICATIONS TO TAKE
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    losartan-hydrochlorothiazide 100mg-12.5mg oral tablet  -- 1 tab(s) by mouth once a day  -- Indication: For Chronic hypertension    donepezil 10 mg oral tablet  -- 1 tab(s) by mouth once a day (at bedtime)  -- Indication: 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

## 2018-08-23 NOTE — DISCHARGE NOTE ADULT - VISION (WITH CORRECTIVE LENSES IF THE PATIENT USUALLY WEARS THEM):
pt uses glasses not with pt/Normal vision: sees adequately in most situations; can see medication labels, newsprint

## 2018-10-26 ENCOUNTER — APPOINTMENT (OUTPATIENT)
Dept: SURGERY | Facility: CLINIC | Age: 83
End: 2018-10-26
Payer: MEDICARE

## 2018-10-26 VITALS — WEIGHT: 112 LBS | HEIGHT: 68 IN | BODY MASS INDEX: 16.97 KG/M2

## 2018-10-26 DIAGNOSIS — K80.20 CALCULUS OF GALLBLADDER W/OUT CHOLECYSTITIS W/OUT OBSTRUCTION: ICD-10-CM

## 2018-10-26 DIAGNOSIS — R10.9 UNSPECIFIED ABDOMINAL PAIN: ICD-10-CM

## 2018-10-26 DIAGNOSIS — E11.9 TYPE 2 DIABETES MELLITUS W/OUT COMPLICATIONS: ICD-10-CM

## 2018-10-26 PROBLEM — G30.0 ALZHEIMER'S DISEASE WITH EARLY ONSET: Chronic | Status: ACTIVE | Noted: 2018-08-20

## 2018-10-26 PROBLEM — I10 ESSENTIAL (PRIMARY) HYPERTENSION: Chronic | Status: ACTIVE | Noted: 2018-08-20

## 2018-10-26 PROBLEM — I48.2 CHRONIC ATRIAL FIBRILLATION: Chronic | Status: ACTIVE | Noted: 2018-08-20

## 2018-10-26 PROCEDURE — 99213 OFFICE O/P EST LOW 20 MIN: CPT

## 2018-10-27 PROBLEM — R10.9 ABDOMINAL DISCOMFORT: Status: ACTIVE | Noted: 2018-10-27

## 2018-10-27 PROBLEM — K80.20 CHOLELITHIASIS: Status: ACTIVE | Noted: 2018-10-27

## 2018-11-08 ENCOUNTER — OUTPATIENT (OUTPATIENT)
Dept: OUTPATIENT SERVICES | Facility: HOSPITAL | Age: 83
LOS: 1 days | End: 2018-11-08
Payer: MEDICARE

## 2018-11-08 ENCOUNTER — APPOINTMENT (OUTPATIENT)
Dept: CT IMAGING | Facility: HOSPITAL | Age: 83
End: 2018-11-08
Payer: MEDICARE

## 2018-11-08 DIAGNOSIS — Z00.8 ENCOUNTER FOR OTHER GENERAL EXAMINATION: ICD-10-CM

## 2018-11-08 PROCEDURE — 74160 CT ABDOMEN W/CONTRAST: CPT | Mod: 26

## 2018-11-08 PROCEDURE — 74160 CT ABDOMEN W/CONTRAST: CPT

## 2018-12-13 ENCOUNTER — APPOINTMENT (OUTPATIENT)
Dept: OBGYN | Facility: CLINIC | Age: 83
End: 2018-12-13

## 2019-01-25 ENCOUNTER — EMERGENCY (EMERGENCY)
Facility: HOSPITAL | Age: 84
LOS: 1 days | End: 2019-01-25
Attending: EMERGENCY MEDICINE | Admitting: EMERGENCY MEDICINE
Payer: MEDICARE

## 2019-01-25 VITALS — HEIGHT: 62 IN | WEIGHT: 100.09 LBS

## 2019-01-25 DIAGNOSIS — R40.20 UNSPECIFIED COMA: ICD-10-CM

## 2019-01-25 PROCEDURE — 92950 HEART/LUNG RESUSCITATION CPR: CPT

## 2019-01-25 PROCEDURE — 31500 INSERT EMERGENCY AIRWAY: CPT

## 2019-01-25 PROCEDURE — 99285 EMERGENCY DEPT VISIT HI MDM: CPT | Mod: 25

## 2019-01-25 NOTE — ED PROVIDER NOTE - MEDICAL DECISION MAKING DETAILS
cardiac arrest- upon patient family arrival  expressed patient's wishes of no extreme measures, intubation, resuscitation- pt

## 2019-01-25 NOTE — ED ADULT NURSE NOTE - NS TRANSFER PATIENT BELONGINGS
Clothing/sweatshirt and socks. No jewelry present on pt on arrival. shirt/sweatshirt discarded diue to being cut during code

## 2019-01-25 NOTE — ED ADULT TRIAGE NOTE - CHIEF COMPLAINT QUOTE
Pt presents from home via EMS in cardiac arrest with CPR in progress. Per EMS pt was at home, was unresponsive and was down for approx. 15 min after eating lunch. Call to EMS received 1358. Pt received epi x2 en route, and was shocked 360 joules x1. Per EMS upon arrival, copius amounts of blood suctioned from patients mouth. Pt presents pulseless, being bagged. POA left ej, 20g. Dr. Alvarez and respiratory at bedside upon arrival.

## 2019-01-25 NOTE — ED ADULT NURSE NOTE - CHIEF COMPLAINT QUOTE
Pt presents from home via EMS in cardiac arrest with CPR in progress. Per EMS pt was at home, was unresponsive and was down for approx. 15 min after eating lunch. Call to EMS received 1358. Pt received epi x2 en route, and was shocked 360 joules x1. Per EMS upon arrival, copius amounts of blood suctioned from patients mouth. Pt presents pulseless, being bagged. POA left ej, 20g. Dr. Alvarez and respiratory at bedside upon arrival. Pt presents from home via EMS in cardiac arrest with CPR in progress. Per EMS pt was at home, was unresponsive and was down for approx. 15 min after eating lunch. Call to EMS received 1358. Pt received epi x2 en route, and was shocked 360 joules x1. Per EMS upon arrival, copious amounts of blood suctioned from patients mouth. Pt presents pulseless, being bagged. POA left ej, 20g. Dr. Alvarez and respiratory at bedside upon arrival.

## 2019-01-25 NOTE — ED ADULT NURSE NOTE - OBJECTIVE STATEMENT
pt BIB EMS from home with CPR in progress. Per EMS pt found to be unresponsive at home on couch. pt found with bloody sputum, not breathing. Per pt caretaker, pt had been given lunch, caretaker stepped away and came back to find bloody discharge from nose and mouth. Finger stick 88 in field. pt BIB EMS from home with CPR in progress. Per EMS pt found to be unresponsive at home on couch. pt found with bloody sputum, not breathing. Per pt caretaker, pt had been given lunch, caretaker stepped away and came back to find bloody discharge from nose and mouth. Finger stick 88 in field. two rounds of Epi given in field. Two rounds of Epi and two rounds Bicarb administered in ED. Enzo Wood contacted  who expressed wishes for no further intervention. See cardiac code sheet

## 2019-01-25 NOTE — ED PROVIDER NOTE - PROGRESS NOTE DETAILS
PARAMJIT Wood- pt given 2 rounds epi in the field, 2 rounds of epi here with 1 round of bicarb. One shock administered. Patient intubated and confirmed via capnography and listen. Once family arrived and expressed wishes for no further intervention, CPR was stopped and patient time of death was called. Death Certificate # [id number] Death Certificate # [id number]  I d/w ME.

## 2019-01-25 NOTE — ED PROVIDER NOTE - OBJECTIVE STATEMENT
84 y/o F h/o Dementia BIBA not intubated in cardiac arrest. Given 2 rounds epi in the field. When family arrived, they state she has had significant weight loss recently with decreased PO intake. Aid had given patient lunch, stepped away to wash dishes and came back to find blood coming out of her nose and mouth.   FS in field 88 84 y/o F h/o Dementia BIBA not intubated in cardiac arrest. Given 2 rounds epi in the field and one shock. When family arrived, further info revealed significant patient weight loss recently with decreased PO intake. Today, aid had given patient lunch, stepped away to wash dishes and came back to find blood coming out of her nose and mouth.   FS in field 88 84 y/o F h/o Dementia BIBA not intubated in cardiac arrest. Given 2 rounds epi in the field and one shock. When family arrived, further info revealed significant patient weight loss recently with decreased PO intake. Today, aide had given patient lunch, stepped away to wash dishes and came back to find blood coming out of her nose and mouth.   FS in field 88

## 2019-01-26 NOTE — ED PROCEDURE NOTE - PROCEDURE ADDITIONAL DETAILS
Pt with a lot of bleeding. Therefore, after tracheal intubation, an NG tube was placed by the Night asst nurse manager

## 2023-06-27 NOTE — PHYSICAL THERAPY INITIAL EVALUATION ADULT - PERTINENT HX OF CURRENT PROBLEM, REHAB EVAL
Chief Complaint   Patient presents with     MS     New Patient     Establishing care       Vitals were taken and medications were reconciled.   Catarino Tang, EMT  11:28 AM     Pt admitted due to lethargy and fall out of bed

## 2024-11-20 NOTE — ED PROVIDER NOTE - ATTENDING CONTRIBUTION TO CARE
n/a Kim with [employer]. Patient brought in cardiac arrest. Not intubated. With +bleeding profusely into airway such that they could not intubate in the field. Pt with h/o dementia and she stopped eating few days ago.Weight loss and precipitous decline over the past few days. Intubated by me in the ED. Once family arrived, discussing her clinical status, the  asked us to stop. Pronounced dead at 14:31. I performed a face to face bedside interview with patient regarding history of present illness, review of symptoms and past medical history. I completed an independent physical exam.  I have discussed the patient's plan of care with Physician Assistant (PA). I agree with note as stated above, having amended the EMR as needed to reflect my findings.   This includes History of Present Illness, HIV, Past Medical/Surgical/Family/Social History, Allergies and Home Medications, Review of Systems, Physical Exam, and any Progress Notes during the time I functioned as the attending physician for this patient.

## 2025-01-23 NOTE — DISCHARGE NOTE ADULT - FUNCTIONAL STATUS DATE
Care Transitions Note    Final Call     Outreach Attempts:   HIPAA compliant voicemail left for patient.     This ACM to sign off and close program if return call is not received    Upcoming Appointments:    Future Appointments         Provider Specialty Dept Phone    3/24/2025 1:00 PM Juan Ernst PA-C Urology [phone]    3/26/2025 8:00 AM Maximino Hansen MD Primary Care [phone]    5/20/2025 10:15 AM Berta Mccarthy APRN - [facility] Obstetrics and Gynecology [phone]    8/18/2025 11:45 AM Keegan Cancino MD Pulmonology [phone]    12/15/2025 10:00 AM Noris Hopkins MD Cardiology [phone]            Kristine Paulino RN    23-Aug-2018